# Patient Record
Sex: FEMALE | URBAN - METROPOLITAN AREA
[De-identification: names, ages, dates, MRNs, and addresses within clinical notes are randomized per-mention and may not be internally consistent; named-entity substitution may affect disease eponyms.]

---

## 2021-11-16 ENCOUNTER — APPOINTMENT (RX ONLY)
Dept: URBAN - METROPOLITAN AREA CLINIC 151 | Facility: CLINIC | Age: 66
Setting detail: DERMATOLOGY
End: 2021-11-16

## 2021-11-16 DIAGNOSIS — L90.5 SCAR CONDITIONS AND FIBROSIS OF SKIN: ICD-10-CM

## 2021-11-16 DIAGNOSIS — Z41.9 ENCOUNTER FOR PROCEDURE FOR PURPOSES OTHER THAN REMEDYING HEALTH STATE, UNSPECIFIED: ICD-10-CM

## 2021-11-16 DIAGNOSIS — L98.8 OTHER SPECIFIED DISORDERS OF THE SKIN AND SUBCUTANEOUS TISSUE: ICD-10-CM

## 2021-11-16 DIAGNOSIS — L82.1 OTHER SEBORRHEIC KERATOSIS: ICD-10-CM

## 2021-11-16 PROBLEM — D23.5 OTHER BENIGN NEOPLASM OF SKIN OF TRUNK: Status: ACTIVE | Noted: 2021-11-16

## 2021-11-16 PROCEDURE — 99203 OFFICE O/P NEW LOW 30 MIN: CPT

## 2021-11-16 PROCEDURE — ? PRESCRIPTION

## 2021-11-16 PROCEDURE — ? COUNSELING

## 2021-11-16 PROCEDURE — ? DIAGNOSIS COMMENT

## 2021-11-16 PROCEDURE — ? BOTOX

## 2021-11-16 PROCEDURE — ? PRESCRIPTION MEDICATION MANAGEMENT

## 2021-11-16 RX ORDER — TRETIONIN 1 MG/G
CREAM TOPICAL
Qty: 45 | Refills: 3 | Status: ERX | COMMUNITY
Start: 2021-11-16

## 2021-11-16 RX ADMIN — TRETIONIN: 1 CREAM TOPICAL at 00:00

## 2021-11-16 ASSESSMENT — LOCATION DETAILED DESCRIPTION DERM
LOCATION DETAILED: RIGHT SUPERIOR MEDIAL UPPER BACK
LOCATION DETAILED: LEFT CENTRAL MALAR CHEEK
LOCATION DETAILED: INFERIOR LUMBAR SPINE

## 2021-11-16 ASSESSMENT — LOCATION SIMPLE DESCRIPTION DERM
LOCATION SIMPLE: RIGHT UPPER BACK
LOCATION SIMPLE: LEFT CHEEK
LOCATION SIMPLE: LOWER BACK

## 2021-11-16 ASSESSMENT — LOCATION ZONE DERM
LOCATION ZONE: TRUNK
LOCATION ZONE: FACE

## 2021-11-16 NOTE — PROCEDURE: DIAGNOSIS COMMENT
Render Risk Assessment In Note?: no
Detail Level: Simple
Comment: FBSE - Benign findings today. Rx'd tretinoin 0.1% cream for rhytides to face. Botox - 20U total to glabella, a little above eyebrows to prevent Spock. $320. Fillers - Disc'd 2 syringes to Nasolabial folds and marionette lines, $1400. Fillers handout provided.
Comment: History of back surgery due to pt's arthritis

## 2021-11-16 NOTE — HPI: SKIN LESIONS
How Severe Is Your Skin Lesion?: mild
Is This A New Presentation, Or A Follow-Up?: Skin Lesions
Additional History: Pt notes that there is one lesion to her abdomen that was previously biopsied by Dr. Yoel Cool and has grown back. The lesion was found to be an SK.

## 2021-11-16 NOTE — PROCEDURE: BOTOX
Show Nasal Units: Yes
Show Mentalis Units: No
Expiration Date (Month Year): 1/2024
L Brow Units: 0
Price (Use Numbers Only, No Special Characters Or $): 320
Detail Level: Simple
Lot #: [id number]
Consent: Written consent obtained. Risks include but not limited to lid/brow ptosis, bruising, swelling, diplopia, temporary effect, incomplete chemical denervation.
Dilution (U/0.1 Cc): 4
Post-Care Instructions: Patient instructed to not lie down for 4 hours and limit physical activity for 24 hours. Patient instructed not to travel by airplane for 48 hours.
Glabellar Complex Units: 20

## 2021-11-17 ENCOUNTER — PREPPED CHART (OUTPATIENT)
Dept: URBAN - METROPOLITAN AREA CLINIC 89 | Facility: CLINIC | Age: 66
End: 2021-11-17

## 2021-11-17 PROBLEM — H04.123 DRY EYE SYNDROME: Noted: 2021-11-17

## 2021-11-17 PROBLEM — M06.9 RHEUMATOID ARTHRITIS: Noted: 2021-11-17

## 2021-11-17 PROBLEM — H01.003 BLEPHARITIS, UNSPECIFIED: Noted: 2021-11-17

## 2021-11-17 PROBLEM — B39.4 HISTOPLASMOSIS CAPSULATUM RETINITIS: Noted: 2021-11-17

## 2021-11-17 PROBLEM — H01.006 BLEPHARITIS, UNSPECIFIED: Noted: 2021-11-17

## 2021-11-17 PROBLEM — D86.9: Noted: 2021-11-17

## 2021-11-17 PROBLEM — H35.363 MACULAR DRUSEN: Noted: 2021-11-17

## 2022-05-09 ASSESSMENT — VISUAL ACUITY
OS_CC: 20/25-1
OD_CC: 20/20
OS_PH: 20/20-1

## 2022-05-09 ASSESSMENT — TONOMETRY
OS_IOP_MMHG: 13
OD_IOP_MMHG: 15

## 2022-06-03 ENCOUNTER — FOLLOW UP (OUTPATIENT)
Dept: URBAN - METROPOLITAN AREA CLINIC 89 | Facility: CLINIC | Age: 67
End: 2022-06-03

## 2022-06-03 DIAGNOSIS — H35.363: ICD-10-CM

## 2022-06-03 DIAGNOSIS — B39.4: ICD-10-CM

## 2022-06-03 DIAGNOSIS — D86.9: ICD-10-CM

## 2022-06-03 DIAGNOSIS — H25.13: ICD-10-CM

## 2022-06-03 DIAGNOSIS — H04.123: ICD-10-CM

## 2022-06-03 DIAGNOSIS — H01.003: ICD-10-CM

## 2022-06-03 DIAGNOSIS — M06.9: ICD-10-CM

## 2022-06-03 DIAGNOSIS — H01.006: ICD-10-CM

## 2022-06-03 PROCEDURE — 92014 COMPRE OPH EXAM EST PT 1/>: CPT

## 2022-06-03 PROCEDURE — 92134 CPTRZ OPH DX IMG PST SGM RTA: CPT

## 2022-06-03 PROCEDURE — 92202 OPSCPY EXTND ON/MAC DRAW: CPT

## 2022-06-03 ASSESSMENT — VISUAL ACUITY
OS_CC: 20/30-2
OS_PH: 20/25
OD_CC: 20/20

## 2022-06-03 ASSESSMENT — TONOMETRY
OS_IOP_MMHG: 15
OD_IOP_MMHG: 17

## 2022-12-09 ENCOUNTER — FOLLOW UP (OUTPATIENT)
Dept: URBAN - METROPOLITAN AREA CLINIC 89 | Facility: CLINIC | Age: 67
End: 2022-12-09

## 2022-12-09 DIAGNOSIS — B39.4: ICD-10-CM

## 2022-12-09 DIAGNOSIS — M06.9: ICD-10-CM

## 2022-12-09 DIAGNOSIS — H01.003: ICD-10-CM

## 2022-12-09 DIAGNOSIS — H04.123: ICD-10-CM

## 2022-12-09 DIAGNOSIS — H35.363: ICD-10-CM

## 2022-12-09 DIAGNOSIS — D86.9: ICD-10-CM

## 2022-12-09 DIAGNOSIS — H01.006: ICD-10-CM

## 2022-12-09 PROCEDURE — 92202 OPSCPY EXTND ON/MAC DRAW: CPT

## 2022-12-09 PROCEDURE — 92014 COMPRE OPH EXAM EST PT 1/>: CPT

## 2022-12-09 ASSESSMENT — TONOMETRY
OD_IOP_MMHG: 14
OS_IOP_MMHG: 15

## 2022-12-09 ASSESSMENT — VISUAL ACUITY
OD_CC: 20/20
OS_CC: 20/25+2

## 2023-06-09 ENCOUNTER — FOLLOW UP (OUTPATIENT)
Dept: URBAN - METROPOLITAN AREA CLINIC 89 | Facility: CLINIC | Age: 68
End: 2023-06-09

## 2023-06-09 DIAGNOSIS — H01.003: ICD-10-CM

## 2023-06-09 DIAGNOSIS — D86.9: ICD-10-CM

## 2023-06-09 DIAGNOSIS — H04.123: ICD-10-CM

## 2023-06-09 DIAGNOSIS — B39.4: ICD-10-CM

## 2023-06-09 DIAGNOSIS — H25.13: ICD-10-CM

## 2023-06-09 DIAGNOSIS — M06.9: ICD-10-CM

## 2023-06-09 DIAGNOSIS — H35.363: ICD-10-CM

## 2023-06-09 PROCEDURE — 92202 OPSCPY EXTND ON/MAC DRAW: CPT

## 2023-06-09 PROCEDURE — 92014 COMPRE OPH EXAM EST PT 1/>: CPT

## 2023-06-09 PROCEDURE — 92134 CPTRZ OPH DX IMG PST SGM RTA: CPT

## 2023-06-09 ASSESSMENT — VISUAL ACUITY
OS_CC: 20/20-2
OD_CC: 20/20

## 2023-06-09 ASSESSMENT — TONOMETRY
OD_IOP_MMHG: 14
OS_IOP_MMHG: 12